# Patient Record
Sex: FEMALE | Race: BLACK OR AFRICAN AMERICAN | HISPANIC OR LATINO | URBAN - METROPOLITAN AREA
[De-identification: names, ages, dates, MRNs, and addresses within clinical notes are randomized per-mention and may not be internally consistent; named-entity substitution may affect disease eponyms.]

---

## 2017-04-12 ENCOUNTER — ALLSCRIPTS OFFICE VISIT (OUTPATIENT)
Dept: OTHER | Facility: OTHER | Age: 27
End: 2017-04-12

## 2018-01-11 NOTE — MISCELLANEOUS
Reason For Visit  Reason For Visit Free Text Note Form: Urgent request for [address]     Case Management Documentation [facility]:   Information obtained from the patient, Parent(s) and medical record  She is also dealing with additional issues such as chronic/terminal disease  Patient is participating in [facility] and [employer]  Action Plan: supportive counseling/advocacy, information provided and [facility]  plan reviewed  Progress Note  Sw met with this 24 y/o single female pt her mother and 11 month old son this date at Providers request to assist with [address] appointment  Pt resides with her supportive mother and young son  Pt does c/o feelings of depression  Pt denies any Si/HI  She does relate she has trouble sleeping and with her appetite  Mother reports pt has low self esteem due to facial acne and request a cream for same  SW encouraged pt to discuss same with her medical provider during upcoming visit  Mother reports pt has hx of depression since age 17 with previous in pt admission years ago  Pt has moved to [place] from [place] about a year ago but has not had any MH services since this time  List of providers reviewed and with pt's permission referral made to [facility] as it is near to their home  Pt has an appointment for Monday 3/7/16 @ 3pm  Pt to f/u with SW as needed  Active Problems    1  Anemia (285 9) (D64 9)   2  Constipation (564 00) (K59 00)    Current Meds   1  Colace 100 MG Oral Capsule; TAKE 1 CAPSULE DAILY; Therapy: [id number] to (Evaluate:[id number])  Requested for: 00FYI8000; Last   Rx:[id number] Ordered    Allergies    1  No Known Drug Allergies    2  No Known Environmental Allergies   3   No Known Food Allergies    Future Appointments    Date/Time Provider Specialty Site   03/04/2016 09:30 AM Alexandria Ulrich 6 PCP     Signatures   Electronically signed by : Nguyen Ness LCSW; Mar  1 2016 11:28AM EST (Author)

## 2018-01-16 NOTE — MISCELLANEOUS
Provider Comments  Provider Comments:   PT NO SHOW FOR HER ANNUAL APPT        Signatures   Electronically signed by : Kelly Steve, ; Apr 12 2017  3:24PM EST                       (Author)

## 2018-01-16 NOTE — PROGRESS NOTES
Assessment    1  Constipation (564 00) (K59 00)     23yo  here for annual exam  Last pap was  and was normal  Complaining of constipation     Plan  Constipation    · Colace 100 MG Oral Capsule; TAKE 1 CAPSULE DAILY   Rx By: Heidi Jacob; Dispense: 30 Days ; #:30 Capsule; Refill: 4; For: Constipation; SONALI = N; Verified Transmission to [id number] Last Updated By: System, [employer]; 2016 1:36:04 PM     Constipation: Recommended colace, increased water intake, and  increased fiber via vegetables or metamucil/psyllium husks  Follow up with PCP on   RTC for annual, or when needing contraception:     Discussion/Summary  health maintenance visit Currently, she eats a healthy diet and eats an adequate diet  the risks and benefits of cervical cancer screening were discussed cervical cancer screening is current cervical cancer screening is not indicated Breast cancer screening: the risks and benefits of breast cancer screening were discussed and breast cancer screening is not indicated  Advice and education were given regarding nutrition, aerobic exercise, weight loss, reproductive health, contraception, sunscreen use, helmet use, seat belt use and fall risk reduction  Patient discussion: discussed with the patient  Chief Complaint  Patient complains of constipation and hemorrhoids  History of Present Illness  HPI: 23  here for annual exam  Delivered vaginally in September  Only issue since then has been constipation and straining to defecate  Patient states she does not have bowel movements every day  They are every few days  Sometimes she feels like she has hemorrhoids but not all the time  Patient has not tried anything for this problem  States stools are hard  Otherwise periods are regular, bleeding lasts 5 days  Patient does not want anything for contraception at this time because she is not sexually active  GYN HM, Adult Female [facility]:  The patient is being seen for a health maintenance and annual evaluation  Social History: Household members include 1 son(s) and aunt  She is unmarried  Work status: not currently employed  The patient is a current cigarette smoker  She reports occasional alcohol use  She has never used illicit drugs  General Health: The patient's health since the last visit is described as fair  She does not have regular dental visits  She denies vision problems  She denies hearing loss  Immunizations status: up to date  Lifestyle:  She does not have a healthy diet  She does not exercise regularly  She uses tobacco  The patient is a current cigarette smoker  She is not ready to quit using tobacco  She consumes alcohol  She reports occasional alcohol use  She denies drug use  Reproductive health:  she reports no menstrual problems  she uses no contraception  she is not sexually active  pregnancy history: G 1P 1, 1  Screening:      Review of Systems  no pelvic pain, no pelvic pressure, no vaginal pain, no vaginal discharge, no vaginal itching, no vaginal odor, no nonmenstrual bleeding, no dysuria, no bladder pain and no burning sensation during urination  Constitutional: No fever, no chills, feels well, no tiredness, no recent weight gain or loss  ENT: no ear ache, no loss of hearing, no nosebleeds or nasal discharge, no sore throat or hoarseness  Cardiovascular: no complaints of slow or fast heart rate, no chest pain, no palpitations, no leg claudication or lower extremity edema  Respiratory: no complaints of shortness of breath, no wheezing, no dyspnea on exertion, no orthopnea or PND  Breasts: no complaints of breast pain, breast lump or nipple discharge  Gastrointestinal: as noted in HPI  Genitourinary: no complaints of dysuria, no incontinence, no pelvic pain, no dysmenorrhea, no vaginal discharge or abnormal vaginal bleeding     Musculoskeletal: no complaints of arthralgia, no myalgia, no joint swelling or stiffness, no limb pain or swelling  Integumentary: no complaints of skin rash or lesion, no itching or dry skin, no skin wounds  Neurological: no complaints of headache, no confusion, no numbness or tingling, no dizziness or fainting  Over the past 2 weeks, how often have you been bothered by the following problems? Score 3     ROS reviewed  OB History  Pregnancy History (Brief):   Prior pregnancies: : 1  Para:   Delivery type: 1 vaginal       Active Problems    1  Anemia (285 9) (D64 9)    Past Medical History    The active problems and past medical history were reviewed and updated today  Family History    · No pertinent family history    Social History    · Denied: History of Alcohol use   · Denied: History of Drug use   · Former smoker (V15 82) (C24 151)   · QUIT     Allergies    1  No Known Drug Allergies    2  No Known Environmental Allergies   3  No Known Food Allergies    Vitals   Recorded: 23TYT1290 01:09PM   Heart Rate 52   Systolic 618, Sitting   Diastolic 62, Sitting   Height 5 ft 5 in   Weight 194 lb    BMI Calculated 32 28   BSA Calculated 1 95   LMP [id number]   Pain Scale 0     Physical Exam    Constitutional   General appearance: No acute distress, well appearing and well nourished  obese, poorly hydrated and Scarring over cheeks from acne  Active lesions present  Neck   Neck: Normal, supple, trachea midline, no masses  Thyroid: Normal, no thyromegaly  Pulmonary   Respiratory effort: No increased work of breathing or signs of respiratory distress  Auscultation of lungs: Clear to auscultation  Cardiovascular   Auscultation of heart: Normal rate and rhythm, normal S1 and S2, no murmurs  Peripheral vascular exam: Normal pulses Throughout  Genitourinary   External genitalia: Abnormal   One area of folliculitis over mons  Vagina: Normal, no lesions or dryness appreciated      Urethra: Normal     Urethral meatus: Normal     Bladder: Normal, soft, non-tender and no prolapse or masses appreciated  Cervix: Normal, no palpable masses  Uterus: Normal, non-tender, not enlarged, and no palpable masses  Adnexa/parametria: Normal, non-tender and no fullness or masses appreciated  No hemorroids seen   Chest   Breasts: Normal and no dimpling or skin changes noted  Abdomen   Abdomen: Normal, non-tender, and no organomegaly noted  Lymphatic   Palpation of lymph nodes in neck, axillae, groin and/or other locations: No lymphadenopathy or masses noted  Skin   Skin and subcutaneous tissue: Abnormal   Clinical impression: acne (on both cheeks and on the entire chin )  Psychiatric   Orientation to person, place, and time: Normal     Mood and affect: Normal        Attending Note  Attending Note: Level of Participation: I was present in clinic, but did not examine the patient  I agree with the Resident's note        Future Appointments    Date/Time Provider Specialty Site   02/11/2016 09:30 AM Alexandria Dorado PCP     Signatures   Electronically signed by : FAM Villalobos ; Feb 4 2016  2:00PM EST                       (Author)    Electronically signed by : FAM Lucas ; Feb 4 2016  4:43PM EST                       (Author)